# Patient Record
Sex: FEMALE | URBAN - METROPOLITAN AREA
[De-identification: names, ages, dates, MRNs, and addresses within clinical notes are randomized per-mention and may not be internally consistent; named-entity substitution may affect disease eponyms.]

---

## 2024-07-15 ENCOUNTER — NURSING HOME VISIT (OUTPATIENT)
Dept: POST ACUTE CARE | Facility: EXTERNAL LOCATION | Age: 89
End: 2024-07-15
Payer: MEDICARE

## 2024-07-15 DIAGNOSIS — K21.9 GASTROESOPHAGEAL REFLUX DISEASE, UNSPECIFIED WHETHER ESOPHAGITIS PRESENT: ICD-10-CM

## 2024-07-15 DIAGNOSIS — I10 HYPERTENSION, UNSPECIFIED TYPE: ICD-10-CM

## 2024-07-15 DIAGNOSIS — E13.9 OTHER SPECIFIED DIABETES MELLITUS WITHOUT COMPLICATION, WITH LONG-TERM CURRENT USE OF INSULIN (MULTI): ICD-10-CM

## 2024-07-15 DIAGNOSIS — I48.91 ATRIAL FIBRILLATION, UNSPECIFIED TYPE (MULTI): ICD-10-CM

## 2024-07-15 DIAGNOSIS — I21.4 NSTEMI (NON-ST ELEVATED MYOCARDIAL INFARCTION) (MULTI): Primary | ICD-10-CM

## 2024-07-15 DIAGNOSIS — Z79.4 OTHER SPECIFIED DIABETES MELLITUS WITHOUT COMPLICATION, WITH LONG-TERM CURRENT USE OF INSULIN (MULTI): ICD-10-CM

## 2024-07-15 PROBLEM — R53.1 WEAKNESS: Status: ACTIVE | Noted: 2024-07-15

## 2024-07-15 PROCEDURE — 99305 1ST NF CARE MODERATE MDM 35: CPT | Performed by: INTERNAL MEDICINE

## 2024-07-15 NOTE — LETTER
Patient: Camila Omer  : 1933    Encounter Date: 07/15/2024    HISTORY & PHYSICAL    Subjective  Chief complaint: Camila Omer is a 89 y.o. female who is being seen and evaluated for multiple medical problems.  Patient admitted to SNF for therapy due to weakness after recent hospitalization.    HPI:  HPI  Patient was admitted to the hospital and treated for NSTEMI.  Patient on anticoagulant and f/u outpatient.  PT OT evaluated patient with recommendations for SNF placement for further medical management and therapy.  Patient was seen and examined at the bedside, appears to be in no acute distress.  Denies nausea vomiting fever chills.  Denies chest pain or shortness of breath.  Past Medical History:   Diagnosis Date   • Atrial fibrillation (Multi)    • Cardiomyopathy (Multi)    • Diabetes mellitus (Multi)    • Hypertension    • NSTEMI (non-ST elevated myocardial infarction) (Multi)    • Presence of coronary angioplasty implant and graft    • Spondylosis, cervical        No past surgical history on file.    Family History   Problem Relation Name Age of Onset   • No Known Problems Mother     • No Known Problems Father         Social History     Socioeconomic History   • Marital status:        Vital signs: 152/77, 97.3, 78 18, 96%    Objective  Physical Exam  Constitutional:       General: She is not in acute distress.  Eyes:      Extraocular Movements: Extraocular movements intact.   Cardiovascular:      Rate and Rhythm: Normal rate and regular rhythm.   Pulmonary:      Effort: Pulmonary effort is normal.      Breath sounds: Normal breath sounds.   Abdominal:      General: Bowel sounds are normal.      Palpations: Abdomen is soft.   Musculoskeletal:      Cervical back: Neck supple.      Right lower leg: No edema.      Left lower leg: No edema.   Neurological:      Mental Status: She is alert.   Psychiatric:         Mood and Affect: Mood normal.         Behavior: Behavior is cooperative.          Assessment/Plan  Problem List Items Addressed This Visit       Atrial fibrillation (Multi)     Eliquis  Bleeding precautions  Metoprolol         NSTEMI (non-ST elevated myocardial infarction) (Multi) - Primary     Clopidogrel  Bleeding precautions  F/u outpatient cardiology         Hypertension     Monitor blood pressure  Metoprolol         Diabetes mellitus (Multi)     Monitor fasting blood sugar  Januvia  Insulin         GERD (gastroesophageal reflux disease)     PPI  Monitor GI symptoms          Hospital records reviewed  Medications, treatments, and labs reviewed  Continue medications and treatments as listed in EMR  Discussed with nursing and therapy      Scribe Attestation  I, Amos De León   attest that this documentation has been prepared under the direction and in the presence of Taryn Wen MD    Provider Attestation - Scribe documentation  All medical record entries made by the Scribe were at my direction and personally dictated by me. I have reviewed the chart and agree that the record accurately reflects my personal performance of the history, physical exam, discussion and plan.   Taryn Wen MD      Electronically Signed By: Taryn Wen MD   7/15/24 11:45 AM

## 2024-07-15 NOTE — ASSESSMENT & PLAN NOTE
Clopidogrel  Bleeding precautions  F/u outpatient cardiology   What Type Of Note Output Would You Prefer (Optional)?: Standard Output Is This A New Presentation, Or A Follow-Up?: Rash

## 2024-07-15 NOTE — PROGRESS NOTES
HISTORY & PHYSICAL    Subjective   Chief complaint: Camila Omer is a 89 y.o. female who is being seen and evaluated for multiple medical problems.  Patient admitted to SNF for therapy due to weakness after recent hospitalization.    HPI:  HPI  Patient was admitted to the hospital and treated for NSTEMI.  Patient on anticoagulant and f/u outpatient.  PT OT evaluated patient with recommendations for SNF placement for further medical management and therapy.  Patient was seen and examined at the bedside, appears to be in no acute distress.  Denies nausea vomiting fever chills.  Denies chest pain or shortness of breath.  Past Medical History:   Diagnosis Date    Atrial fibrillation (Multi)     Cardiomyopathy (Multi)     Diabetes mellitus (Multi)     Hypertension     NSTEMI (non-ST elevated myocardial infarction) (Multi)     Presence of coronary angioplasty implant and graft     Spondylosis, cervical        No past surgical history on file.    Family History   Problem Relation Name Age of Onset    No Known Problems Mother      No Known Problems Father         Social History     Socioeconomic History    Marital status:        Vital signs: 152/77, 97.3, 78 18, 96%    Objective   Physical Exam  Constitutional:       General: She is not in acute distress.  Eyes:      Extraocular Movements: Extraocular movements intact.   Cardiovascular:      Rate and Rhythm: Normal rate and regular rhythm.   Pulmonary:      Effort: Pulmonary effort is normal.      Breath sounds: Normal breath sounds.   Abdominal:      General: Bowel sounds are normal.      Palpations: Abdomen is soft.   Musculoskeletal:      Cervical back: Neck supple.      Right lower leg: No edema.      Left lower leg: No edema.   Neurological:      Mental Status: She is alert.   Psychiatric:         Mood and Affect: Mood normal.         Behavior: Behavior is cooperative.         Assessment/Plan   Problem List Items Addressed This Visit       Atrial fibrillation  (Multi)     Eliquis  Bleeding precautions  Metoprolol         NSTEMI (non-ST elevated myocardial infarction) (Multi) - Primary     Clopidogrel  Bleeding precautions  F/u outpatient cardiology         Hypertension     Monitor blood pressure  Metoprolol         Diabetes mellitus (Multi)     Monitor fasting blood sugar  Januvia  Insulin         GERD (gastroesophageal reflux disease)     PPI  Monitor GI symptoms          Hospital records reviewed  Medications, treatments, and labs reviewed  Continue medications and treatments as listed in EMR  Discussed with nursing and therapy      Scribe Attestation  IJessica Scribe   attest that this documentation has been prepared under the direction and in the presence of Taryn Wen MD    Provider Attestation - Scribe documentation  All medical record entries made by the Scribe were at my direction and personally dictated by me. I have reviewed the chart and agree that the record accurately reflects my personal performance of the history, physical exam, discussion and plan.   Taryn Wen MD

## 2024-07-16 ENCOUNTER — NURSING HOME VISIT (OUTPATIENT)
Dept: POST ACUTE CARE | Facility: EXTERNAL LOCATION | Age: 89
End: 2024-07-16
Payer: MEDICARE

## 2024-07-16 DIAGNOSIS — I48.91 ATRIAL FIBRILLATION, UNSPECIFIED TYPE (MULTI): ICD-10-CM

## 2024-07-16 DIAGNOSIS — R53.1 WEAKNESS: ICD-10-CM

## 2024-07-16 DIAGNOSIS — K21.9 GASTROESOPHAGEAL REFLUX DISEASE, UNSPECIFIED WHETHER ESOPHAGITIS PRESENT: ICD-10-CM

## 2024-07-16 DIAGNOSIS — E13.9 OTHER SPECIFIED DIABETES MELLITUS WITHOUT COMPLICATION, WITH LONG-TERM CURRENT USE OF INSULIN (MULTI): ICD-10-CM

## 2024-07-16 DIAGNOSIS — Z79.4 OTHER SPECIFIED DIABETES MELLITUS WITHOUT COMPLICATION, WITH LONG-TERM CURRENT USE OF INSULIN (MULTI): ICD-10-CM

## 2024-07-16 DIAGNOSIS — I10 HYPERTENSION, UNSPECIFIED TYPE: ICD-10-CM

## 2024-07-16 DIAGNOSIS — I21.4 NSTEMI (NON-ST ELEVATED MYOCARDIAL INFARCTION) (MULTI): Primary | ICD-10-CM

## 2024-07-16 PROCEDURE — 99309 SBSQ NF CARE MODERATE MDM 30: CPT | Performed by: INTERNAL MEDICINE

## 2024-07-16 NOTE — LETTER
Patient: Camila Omer  : 1933    Encounter Date: 2024    PROGRESS NOTE    Subjective  Chief complaint: Camila Omer is a 89 y.o. female who is an acute skilled patient being seen and evaluated for weakness & monthly general medical care and follow-up    HPI:  Patient presents for general medical care and f/u.  Patient seen and examined at bedside. Patient has diagnosis of GERD, denies heartburn, regurgitation, epigastric discomfort, sour taste, and cough.   HTN, denies fatigue, sob, and palpitations. T2DM, Patient denies vision changes, excessive thirst, sweating, urinary frequency. CVA, denies changes in weakness and speech. Afib, denies palpitation or chest pain. She is being evaluated by therapy today. No issues per nursing.  Patient has no acute complaints.      Objective  Vital signs: 134/50,73,95,    Physical Exam  Constitutional:       General: She is not in acute distress.  Eyes:      Extraocular Movements: Extraocular movements intact.   Cardiovascular:      Rate and Rhythm: Normal rate and regular rhythm.   Pulmonary:      Effort: Pulmonary effort is normal.      Breath sounds: Normal breath sounds.   Abdominal:      General: Bowel sounds are normal.      Palpations: Abdomen is soft.   Musculoskeletal:      Cervical back: Neck supple.      Right lower leg: No edema.      Left lower leg: No edema.   Neurological:      Mental Status: She is alert.   Psychiatric:         Mood and Affect: Mood normal.         Behavior: Behavior is cooperative.         Assessment/Plan  Problem List Items Addressed This Visit       Atrial fibrillation (Multi)     Eliquis  Bleeding precautions  Metoprolol         NSTEMI (non-ST elevated myocardial infarction) (Multi) - Primary     Clopidogrel  Bleeding precautions  F/u outpatient cardiology         Hypertension     Monitor blood pressure  Metoprolol         Diabetes mellitus (Multi)     Monitor fasting blood sugar  Januvia  Insulin         Weakness      Continue working with therapy          GERD (gastroesophageal reflux disease)     PPI  Monitor GI symptoms          Medications, treatments, and labs reviewed  Continue medications and treatments as listed in EMR    Scribe Attestation  IGiana Scribe   attest that this documentation has been prepared under the direction and in the presence of Taryn Wen MD.     Provider Attestation - Scribe documentation  All medical record entries made by the Scribe were at my direction and personally dictated by me. I have reviewed the chart and agree that the record accurately reflects my personal performance of the history, physical exam, discussion and plan.   Taryn Wen MD      Electronically Signed By: Taryn Wen MD   7/16/24  1:39 PM

## 2024-07-16 NOTE — PROGRESS NOTES
PROGRESS NOTE    Subjective   Chief complaint: Camila Omer is a 89 y.o. female who is an acute skilled patient being seen and evaluated for weakness & monthly general medical care and follow-up    HPI:  Patient presents for general medical care and f/u.  Patient seen and examined at bedside. Patient has diagnosis of GERD, denies heartburn, regurgitation, epigastric discomfort, sour taste, and cough.   HTN, denies fatigue, sob, and palpitations. T2DM, Patient denies vision changes, excessive thirst, sweating, urinary frequency. CVA, denies changes in weakness and speech. Afib, denies palpitation or chest pain. She is being evaluated by therapy today. No issues per nursing.  Patient has no acute complaints.      Objective   Vital signs: 134/50,73,95,    Physical Exam  Constitutional:       General: She is not in acute distress.  Eyes:      Extraocular Movements: Extraocular movements intact.   Cardiovascular:      Rate and Rhythm: Normal rate and regular rhythm.   Pulmonary:      Effort: Pulmonary effort is normal.      Breath sounds: Normal breath sounds.   Abdominal:      General: Bowel sounds are normal.      Palpations: Abdomen is soft.   Musculoskeletal:      Cervical back: Neck supple.      Right lower leg: No edema.      Left lower leg: No edema.   Neurological:      Mental Status: She is alert.   Psychiatric:         Mood and Affect: Mood normal.         Behavior: Behavior is cooperative.         Assessment/Plan   Problem List Items Addressed This Visit       Atrial fibrillation (Multi)     Eliquis  Bleeding precautions  Metoprolol         NSTEMI (non-ST elevated myocardial infarction) (Multi) - Primary     Clopidogrel  Bleeding precautions  F/u outpatient cardiology         Hypertension     Monitor blood pressure  Metoprolol         Diabetes mellitus (Multi)     Monitor fasting blood sugar  Januvia  Insulin         Weakness     Continue working with therapy          GERD (gastroesophageal reflux  disease)     PPI  Monitor GI symptoms          Medications, treatments, and labs reviewed  Continue medications and treatments as listed in EMR    Scribe Attestation  I, Amos Salgado   attest that this documentation has been prepared under the direction and in the presence of Taryn Wen MD.     Provider Attestation - Scribe documentation  All medical record entries made by the Scribe were at my direction and personally dictated by me. I have reviewed the chart and agree that the record accurately reflects my personal performance of the history, physical exam, discussion and plan.   Taryn Wen MD

## 2024-07-17 ENCOUNTER — NURSING HOME VISIT (OUTPATIENT)
Dept: POST ACUTE CARE | Facility: EXTERNAL LOCATION | Age: 89
End: 2024-07-17
Payer: MEDICARE

## 2024-07-17 DIAGNOSIS — I48.91 ATRIAL FIBRILLATION, UNSPECIFIED TYPE (MULTI): ICD-10-CM

## 2024-07-17 DIAGNOSIS — M79.671 RIGHT FOOT PAIN: Primary | ICD-10-CM

## 2024-07-17 DIAGNOSIS — I10 HYPERTENSION, UNSPECIFIED TYPE: ICD-10-CM

## 2024-07-17 PROCEDURE — 99308 SBSQ NF CARE LOW MDM 20: CPT | Performed by: REGISTERED NURSE

## 2024-07-17 NOTE — LETTER
Patient: Camila Omer  : 1933    Encounter Date: 2024    PROGRESS NOTE    Subjective  Chief complaint: Camila Omer is a 89 y.o. female who is an acute skilled patient being seen and evaluated for weakness    HPI:  24 Patient in therapy d/t generalized weakness.  Patient presents for f/u.  Continues to work toward goals in therapy.  No new complaints at this time.    Objective  Vital signs: 158/88, 97.1, 77, 18, 97%    Physical Exam  Constitutional:       General: She is not in acute distress.  Eyes:      Extraocular Movements: Extraocular movements intact.   Pulmonary:      Effort: Pulmonary effort is normal.   Musculoskeletal:      Cervical back: Neck supple.   Neurological:      Mental Status: She is alert.   Psychiatric:         Mood and Affect: Mood normal.         Behavior: Behavior is cooperative.         Assessment/Plan  Problem List Items Addressed This Visit       Atrial fibrillation (Multi)     Eliquis  Bleeding precautions  Metoprolol  Monitor HR            Hypertension     Monitor blood pressure  Metoprolol         Foot pain - Primary     Monitor   Check uric acid level           Medications, treatments, and labs reviewed  Continue medications and treatments as listed in [facility]    Scribe Attestation  ITessa Scribe   attest that this documentation has been prepared under the direction and in the presence of CHELSEA De La Cruz.    Provider Attestation - Scribe documentation  All medical record entries made by the Scribe were at my direction and personally dictated by me. I have reviewed the chart and agree that the record accurately reflects my personal performance of the history, physical exam, discussion and plan.    CHELSEA De La Cruz        Electronically Signed By: CHELSEA De La Cruz   24  3:43 PM

## 2024-07-18 ENCOUNTER — NURSING HOME VISIT (OUTPATIENT)
Dept: POST ACUTE CARE | Facility: EXTERNAL LOCATION | Age: 89
End: 2024-07-18
Payer: MEDICARE

## 2024-07-18 DIAGNOSIS — E13.9 OTHER SPECIFIED DIABETES MELLITUS WITHOUT COMPLICATION, WITH LONG-TERM CURRENT USE OF INSULIN (MULTI): ICD-10-CM

## 2024-07-18 DIAGNOSIS — R35.0 URINARY FREQUENCY: ICD-10-CM

## 2024-07-18 DIAGNOSIS — Z79.4 OTHER SPECIFIED DIABETES MELLITUS WITHOUT COMPLICATION, WITH LONG-TERM CURRENT USE OF INSULIN (MULTI): ICD-10-CM

## 2024-07-18 DIAGNOSIS — I10 HYPERTENSION, UNSPECIFIED TYPE: ICD-10-CM

## 2024-07-18 DIAGNOSIS — I21.4 NSTEMI (NON-ST ELEVATED MYOCARDIAL INFARCTION) (MULTI): Primary | ICD-10-CM

## 2024-07-18 DIAGNOSIS — I48.91 ATRIAL FIBRILLATION, UNSPECIFIED TYPE (MULTI): ICD-10-CM

## 2024-07-18 DIAGNOSIS — R53.1 WEAKNESS: ICD-10-CM

## 2024-07-18 PROBLEM — M54.9 BACK PAIN: Status: ACTIVE | Noted: 2024-07-18

## 2024-07-18 PROCEDURE — 99309 SBSQ NF CARE MODERATE MDM 30: CPT | Performed by: INTERNAL MEDICINE

## 2024-07-18 NOTE — PROGRESS NOTES
PROGRESS NOTE    Subjective   Chief complaint: Camila DOE Omer is a 89 y.o. female who is an acute skilled patient being seen and evaluated for weakness and monthly general medical care and follow-up    HPI:  HPI  Patient presents for general medical care and f/u.  Patient seen and examined at bedside.  No issues per nursing.  Patient is working towards goals in therapy. Patient had c/o tight/sore back. Patient did have CPR performed in hospital. Patient to continue Tylenol as needed. Patient also complaining of urinary frequency and UA C and S ordered.  HTN Denies chest pain and headache.  GERD controlled.  Denies heartburn, regurgitation, epigastric discomfort, sour taste, and cough.  DM, denies polydipsia polyuria polyphagia.  AFIB stable, denies palpitations and chest pain.    Objective   Vital signs: 132/68, 88, 12, 98.3, 98%    Physical Exam  Constitutional:       General: She is not in acute distress.  Eyes:      Extraocular Movements: Extraocular movements intact.   Cardiovascular:      Rate and Rhythm: Normal rate and regular rhythm.   Pulmonary:      Effort: Pulmonary effort is normal.      Breath sounds: Normal breath sounds.   Abdominal:      General: Bowel sounds are normal.      Palpations: Abdomen is soft.   Musculoskeletal:      Cervical back: Neck supple.      Right lower leg: No edema.      Left lower leg: No edema.   Neurological:      Mental Status: She is alert.   Psychiatric:         Mood and Affect: Mood normal.         Behavior: Behavior is cooperative.         Assessment/Plan   Problem List Items Addressed This Visit       Atrial fibrillation (Multi)     Eliquis  Bleeding precautions  Metoprolol         NSTEMI (non-ST elevated myocardial infarction) (Multi) - Primary     Clopidogrel  Bleeding precautions  F/u outpatient cardiology         Hypertension     Monitor blood pressure  Metoprolol         Diabetes mellitus (Multi)     Monitor fasting blood sugar  Januvia  Insulin         Weakness      Continue therapy          Medications, treatments, and labs reviewed  Continue medications and treatments as listed in EMR      Scribe Attestation  I, Amos De León   attest that this documentation has been prepared under the direction and in the presence of Taryn Wen MD    Provider Attestation - Scribe documentation  All medical record entries made by the Scribe were at my direction and personally dictated by me. I have reviewed the chart and agree that the record accurately reflects my personal performance of the history, physical exam, discussion and plan.   Taryn Wen MD

## 2024-07-18 NOTE — LETTER
Patient: Camila Omer  : 1933    Encounter Date: 2024    PROGRESS NOTE    Subjective  Chief complaint: Camila Omer is a 89 y.o. female who is an acute skilled patient being seen and evaluated for weakness and monthly general medical care and follow-up    HPI:  HPI  Patient presents for general medical care and f/u.  Patient seen and examined at bedside.  No issues per nursing.  Patient is working towards goals in therapy. Patient had c/o tight/sore back. Patient did have CPR performed in hospital. Patient to continue Tylenol as needed. Patient also complaining of urinary frequency and UA C and S ordered.  HTN Denies chest pain and headache.  GERD controlled.  Denies heartburn, regurgitation, epigastric discomfort, sour taste, and cough.  DM, denies polydipsia polyuria polyphagia.  AFIB stable, denies palpitations and chest pain.    Objective  Vital signs: 132/68, 88, 12, 98.3, 98%    Physical Exam  Constitutional:       General: She is not in acute distress.  Eyes:      Extraocular Movements: Extraocular movements intact.   Cardiovascular:      Rate and Rhythm: Normal rate and regular rhythm.   Pulmonary:      Effort: Pulmonary effort is normal.      Breath sounds: Normal breath sounds.   Abdominal:      General: Bowel sounds are normal.      Palpations: Abdomen is soft.   Musculoskeletal:      Cervical back: Neck supple.      Right lower leg: No edema.      Left lower leg: No edema.   Neurological:      Mental Status: She is alert.   Psychiatric:         Mood and Affect: Mood normal.         Behavior: Behavior is cooperative.         Assessment/Plan  Problem List Items Addressed This Visit       Atrial fibrillation (Multi)     Eliquis  Bleeding precautions  Metoprolol         NSTEMI (non-ST elevated myocardial infarction) (Multi) - Primary     Clopidogrel  Bleeding precautions  F/u outpatient cardiology         Hypertension     Monitor blood pressure  Metoprolol         Diabetes mellitus  (Multi)     Monitor fasting blood sugar  Januvia  Insulin         Weakness     Continue therapy          Medications, treatments, and labs reviewed  Continue medications and treatments as listed in EMR      Scribe Attestation  I, Amos De León   attest that this documentation has been prepared under the direction and in the presence of Taryn Wen MD    Provider Attestation - Scribe documentation  All medical record entries made by the Scribe were at my direction and personally dictated by me. I have reviewed the chart and agree that the record accurately reflects my personal performance of the history, physical exam, discussion and plan.   Taryn Wen MD        Electronically Signed By: Taryn Wen MD   7/18/24  2:50 PM

## 2024-07-22 ENCOUNTER — NURSING HOME VISIT (OUTPATIENT)
Dept: POST ACUTE CARE | Facility: EXTERNAL LOCATION | Age: 89
End: 2024-07-22
Payer: MEDICARE

## 2024-07-22 DIAGNOSIS — M79.671 RIGHT FOOT PAIN: ICD-10-CM

## 2024-07-22 DIAGNOSIS — I48.91 ATRIAL FIBRILLATION, UNSPECIFIED TYPE (MULTI): ICD-10-CM

## 2024-07-22 DIAGNOSIS — E13.9 OTHER SPECIFIED DIABETES MELLITUS WITHOUT COMPLICATION, WITH LONG-TERM CURRENT USE OF INSULIN (MULTI): ICD-10-CM

## 2024-07-22 DIAGNOSIS — R53.1 WEAKNESS: ICD-10-CM

## 2024-07-22 DIAGNOSIS — Z79.4 OTHER SPECIFIED DIABETES MELLITUS WITHOUT COMPLICATION, WITH LONG-TERM CURRENT USE OF INSULIN (MULTI): ICD-10-CM

## 2024-07-22 DIAGNOSIS — K21.9 GASTROESOPHAGEAL REFLUX DISEASE, UNSPECIFIED WHETHER ESOPHAGITIS PRESENT: ICD-10-CM

## 2024-07-22 DIAGNOSIS — I10 HYPERTENSION, UNSPECIFIED TYPE: ICD-10-CM

## 2024-07-22 PROBLEM — M79.673 FOOT PAIN: Status: ACTIVE | Noted: 2024-07-22

## 2024-07-22 PROCEDURE — 99308 SBSQ NF CARE LOW MDM 20: CPT | Performed by: INTERNAL MEDICINE

## 2024-07-22 NOTE — LETTER
Patient: Camila Omer  : 1933    Encounter Date: 2024    PROGRESS NOTE    Subjective  Chief complaint: Camila Omer is a 89 y.o. female who is an acute skilled patient being seen and evaluated for weakness    HPI:  Patient presents for f/u therapy and general medical care.  Patient seen and examined at beside.  Therapy has been working with the patient to improve strength, endurance, ADLs, and transfers d/t generalized weakness. She has right foot and back pain. PRN tylenol helps with back pain.  Patient has no acute concerns today.      Objective  Vital signs: 155/89,72,100%,     Physical Exam  Constitutional:       General: She is not in acute distress.  Eyes:      Extraocular Movements: Extraocular movements intact.   Cardiovascular:      Rate and Rhythm: Normal rate and regular rhythm.   Pulmonary:      Effort: Pulmonary effort is normal.      Breath sounds: Normal breath sounds.   Abdominal:      General: Bowel sounds are normal.      Palpations: Abdomen is soft.   Musculoskeletal:      Cervical back: Neck supple.      Right lower leg: No edema.      Left lower leg: No edema.   Neurological:      Mental Status: She is alert.   Psychiatric:         Mood and Affect: Mood normal.         Behavior: Behavior is cooperative.         Assessment/Plan  Problem List Items Addressed This Visit       Atrial fibrillation (Multi)     Eliquis  Bleeding precautions  Metoprolol         Hypertension     Monitor blood pressure  Metoprolol         Diabetes mellitus (Multi)     Monitor fasting blood sugar  Januvia  Insulin         Weakness     Continue therapy         GERD (gastroesophageal reflux disease)     PPI  Monitor GI symptoms         Foot pain     Voltaren gel TID to right foot  Monitor           Medications, treatments, and labs reviewed  Continue medications and treatments as listed in EMR    Scribe Attestation  I, Amos Salgado   attest that this documentation has been prepared under  the direction and in the presence of Taryn Wen MD.     Provider Attestation - Scribe documentation  All medical record entries made by the Scribe were at my direction and personally dictated by me. I have reviewed the chart and agree that the record accurately reflects my personal performance of the history, physical exam, discussion and plan.   Taryn Wen MD      Electronically Signed By: Taryn Wen MD   7/22/24  2:11 PM

## 2024-07-22 NOTE — PROGRESS NOTES
PROGRESS NOTE    Subjective   Chief complaint: Camila Omer is a 89 y.o. female who is an acute skilled patient being seen and evaluated for weakness    HPI:  Patient presents for f/u therapy and general medical care.  Patient seen and examined at beside.  Therapy has been working with the patient to improve strength, endurance, ADLs, and transfers d/t generalized weakness. She has right foot and back pain. PRN tylenol helps with back pain.  Patient has no acute concerns today.      Objective   Vital signs: 155/89,72,100%,     Physical Exam  Constitutional:       General: She is not in acute distress.  Eyes:      Extraocular Movements: Extraocular movements intact.   Cardiovascular:      Rate and Rhythm: Normal rate and regular rhythm.   Pulmonary:      Effort: Pulmonary effort is normal.      Breath sounds: Normal breath sounds.   Abdominal:      General: Bowel sounds are normal.      Palpations: Abdomen is soft.   Musculoskeletal:      Cervical back: Neck supple.      Right lower leg: No edema.      Left lower leg: No edema.   Neurological:      Mental Status: She is alert.   Psychiatric:         Mood and Affect: Mood normal.         Behavior: Behavior is cooperative.         Assessment/Plan   Problem List Items Addressed This Visit       Atrial fibrillation (Multi)     Eliquis  Bleeding precautions  Metoprolol         Hypertension     Monitor blood pressure  Metoprolol         Diabetes mellitus (Multi)     Monitor fasting blood sugar  Januvia  Insulin         Weakness     Continue therapy         GERD (gastroesophageal reflux disease)     PPI  Monitor GI symptoms         Foot pain     Voltaren gel TID to right foot  Monitor           Medications, treatments, and labs reviewed  Continue medications and treatments as listed in EMR    Scribe Attestation  Giana VAUGHN, Amos   attest that this documentation has been prepared under the direction and in the presence of Taryn Wen MD.     Provider  Attestation - Scribe documentation  All medical record entries made by the Scribe were at my direction and personally dictated by me. I have reviewed the chart and agree that the record accurately reflects my personal performance of the history, physical exam, discussion and plan.   Taryn Wen MD

## 2024-07-22 NOTE — ASSESSMENT & PLAN NOTE
Monitor fasting blood sugar  Januvia  Insulin   [Cough] : cough [Diabetes] : diabetes [Fever] : no fever [Fatigue] : no fatigue [Chills] : no chills [Hemoptysis] : no hemoptysis [Sputum] : no sputum [Dyspnea] : no dyspnea [Chest Discomfort] : no chest discomfort [GERD] : no gerd [Myalgias] : no myalgias [Rash] : no rash [Anemia] : no anemia [Headache] : no headache [Thyroid Problem] : no thyroid problem

## 2024-07-23 ENCOUNTER — NURSING HOME VISIT (OUTPATIENT)
Dept: POST ACUTE CARE | Facility: EXTERNAL LOCATION | Age: 89
End: 2024-07-23
Payer: MEDICARE

## 2024-07-23 DIAGNOSIS — I48.91 ATRIAL FIBRILLATION, UNSPECIFIED TYPE (MULTI): ICD-10-CM

## 2024-07-23 DIAGNOSIS — I21.4 NSTEMI (NON-ST ELEVATED MYOCARDIAL INFARCTION) (MULTI): Primary | ICD-10-CM

## 2024-07-23 DIAGNOSIS — R53.1 WEAKNESS: ICD-10-CM

## 2024-07-23 DIAGNOSIS — Z79.4 OTHER SPECIFIED DIABETES MELLITUS WITHOUT COMPLICATION, WITH LONG-TERM CURRENT USE OF INSULIN (MULTI): ICD-10-CM

## 2024-07-23 DIAGNOSIS — K21.9 GASTROESOPHAGEAL REFLUX DISEASE, UNSPECIFIED WHETHER ESOPHAGITIS PRESENT: ICD-10-CM

## 2024-07-23 DIAGNOSIS — I10 HYPERTENSION, UNSPECIFIED TYPE: ICD-10-CM

## 2024-07-23 DIAGNOSIS — E13.9 OTHER SPECIFIED DIABETES MELLITUS WITHOUT COMPLICATION, WITH LONG-TERM CURRENT USE OF INSULIN (MULTI): ICD-10-CM

## 2024-07-23 PROCEDURE — 99308 SBSQ NF CARE LOW MDM 20: CPT | Performed by: INTERNAL MEDICINE

## 2024-07-23 NOTE — PROGRESS NOTES
PROGRESS NOTE    Subjective   Chief complaint: Camila DOE Omer is a 89 y.o. female who is an acute skilled patient being seen and evaluated for weakness    HPI:  Patient presents for f/u therapy and general medical care.  Patient seen and examined at beside.  Therapy has been working with the patient to improve strength, endurance, ADLs, and transfers d/t generalized weakness. ST addressing cognition and language\communication skills.  Right foot pain improving with voltaren. Patient has no acute concerns today.      Objective   Vital signs: 162/78,71,98%,     Physical Exam  Constitutional:       General: She is not in acute distress.  Eyes:      Extraocular Movements: Extraocular movements intact.   Cardiovascular:      Rate and Rhythm: Normal rate and regular rhythm.   Pulmonary:      Effort: Pulmonary effort is normal.      Breath sounds: Normal breath sounds.   Abdominal:      General: Bowel sounds are normal.      Palpations: Abdomen is soft.   Musculoskeletal:      Cervical back: Neck supple.      Right lower leg: No edema.      Left lower leg: No edema.   Neurological:      Mental Status: She is alert.   Psychiatric:         Mood and Affect: Mood normal.         Behavior: Behavior is cooperative.         Assessment/Plan   Problem List Items Addressed This Visit       Atrial fibrillation (Multi)     Eliquis  Bleeding precautions  Metoprolol  Monitor HR         NSTEMI (non-ST elevated myocardial infarction) (Multi) - Primary     Clopidogrel  Bleeding precautions  F/u outpatient cardiology         Hypertension     Monitor blood pressure  Metoprolol         Diabetes mellitus (Multi)     Monitor fasting blood sugar  Januvia  Insulin         Weakness     Continue therapy         GERD (gastroesophageal reflux disease)     PPI  Monitor GI symptoms          Medications, treatments, and labs reviewed  Continue medications and treatments as listed in EMR    Amos AttestGiana Carmona Scribe attest  that this documentation has been prepared under the direction and in the presence of Taryn Wen MD.     Provider Attestation - Scribe documentation  All medical record entries made by the Scribe were at my direction and personally dictated by me. I have reviewed the chart and agree that the record accurately reflects my personal performance of the history, physical exam, discussion and plan.   Taryn Wen MD

## 2024-07-23 NOTE — LETTER
Patient: Camila Omer  : 1933    Encounter Date: 2024    PROGRESS NOTE    Subjective  Chief complaint: Camila Omer is a 89 y.o. female who is an acute skilled patient being seen and evaluated for weakness    HPI:  Patient presents for f/u therapy and general medical care.  Patient seen and examined at beside.  Therapy has been working with the patient to improve strength, endurance, ADLs, and transfers d/t generalized weakness. ST addressing cognition and language\communication skills.  Right foot pain improving with voltaren. Patient has no acute concerns today.      Objective  Vital signs: 162/78,71,98%,     Physical Exam  Constitutional:       General: She is not in acute distress.  Eyes:      Extraocular Movements: Extraocular movements intact.   Cardiovascular:      Rate and Rhythm: Normal rate and regular rhythm.   Pulmonary:      Effort: Pulmonary effort is normal.      Breath sounds: Normal breath sounds.   Abdominal:      General: Bowel sounds are normal.      Palpations: Abdomen is soft.   Musculoskeletal:      Cervical back: Neck supple.      Right lower leg: No edema.      Left lower leg: No edema.   Neurological:      Mental Status: She is alert.   Psychiatric:         Mood and Affect: Mood normal.         Behavior: Behavior is cooperative.         Assessment/Plan  Problem List Items Addressed This Visit       Atrial fibrillation (Multi)     Eliquis  Bleeding precautions  Metoprolol  Monitor HR         NSTEMI (non-ST elevated myocardial infarction) (Multi) - Primary     Clopidogrel  Bleeding precautions  F/u outpatient cardiology         Hypertension     Monitor blood pressure  Metoprolol         Diabetes mellitus (Multi)     Monitor fasting blood sugar  Januvia  Insulin         Weakness     Continue therapy         GERD (gastroesophageal reflux disease)     PPI  Monitor GI symptoms          Medications, treatments, and labs reviewed  Continue medications and treatments as  listed in EMR    Scribe Attestation  I, Amos Salgado   attest that this documentation has been prepared under the direction and in the presence of Taryn Wen MD.     Provider Attestation - Scribe documentation  All medical record entries made by the Scribe were at my direction and personally dictated by me. I have reviewed the chart and agree that the record accurately reflects my personal performance of the history, physical exam, discussion and plan.   Taryn Wen MD      Electronically Signed By: Taryn Wen MD   7/23/24  1:39 PM

## 2024-07-24 ENCOUNTER — NURSING HOME VISIT (OUTPATIENT)
Dept: POST ACUTE CARE | Facility: EXTERNAL LOCATION | Age: 89
End: 2024-07-24

## 2024-07-24 ENCOUNTER — NURSING HOME VISIT (OUTPATIENT)
Dept: POST ACUTE CARE | Facility: EXTERNAL LOCATION | Age: 89
End: 2024-07-24
Payer: MEDICARE

## 2024-07-24 DIAGNOSIS — I21.4 NSTEMI (NON-ST ELEVATED MYOCARDIAL INFARCTION) (MULTI): ICD-10-CM

## 2024-07-24 DIAGNOSIS — I10 HYPERTENSION, UNSPECIFIED TYPE: ICD-10-CM

## 2024-07-24 DIAGNOSIS — Z79.4 OTHER SPECIFIED DIABETES MELLITUS WITHOUT COMPLICATION, WITH LONG-TERM CURRENT USE OF INSULIN (MULTI): ICD-10-CM

## 2024-07-24 DIAGNOSIS — E13.9 OTHER SPECIFIED DIABETES MELLITUS WITHOUT COMPLICATION, WITH LONG-TERM CURRENT USE OF INSULIN (MULTI): ICD-10-CM

## 2024-07-24 DIAGNOSIS — R53.1 WEAKNESS: ICD-10-CM

## 2024-07-24 DIAGNOSIS — R53.1 WEAKNESS: Primary | ICD-10-CM

## 2024-07-24 DIAGNOSIS — I48.91 ATRIAL FIBRILLATION, UNSPECIFIED TYPE (MULTI): ICD-10-CM

## 2024-07-24 PROCEDURE — 99309 SBSQ NF CARE MODERATE MDM 30: CPT | Performed by: REGISTERED NURSE

## 2024-07-24 PROCEDURE — 99308 SBSQ NF CARE LOW MDM 20: CPT | Performed by: INTERNAL MEDICINE

## 2024-07-24 NOTE — LETTER
Patient: Camila Omer  : 1933    Encounter Date: 2024    PROGRESS NOTE    Subjective  Chief complaint: Camila Omer is a 89 y.o. female who is an acute skilled patient being seen and evaluated for weakness    HPI:  24 Patient has no new complaints or concerns today.  Continues working towards goals in therapy.  Denies constitutional symptoms.    Objective  Vital signs: 144/67, 97.7, 62, 17, 97%    Physical Exam  Constitutional:       General: She is not in acute distress.  Eyes:      Extraocular Movements: Extraocular movements intact.   Pulmonary:      Effort: Pulmonary effort is normal.   Musculoskeletal:      Cervical back: Neck supple.   Neurological:      Mental Status: She is alert.   Psychiatric:         Mood and Affect: Mood normal.         Behavior: Behavior is cooperative.         Assessment/Plan  Problem List Items Addressed This Visit       Atrial fibrillation (Multi)     Eliquis  Bleeding precautions  Metoprolol  Monitor HR            NSTEMI (non-ST elevated myocardial infarction) (Multi)     Clopidogrel  Bleeding precautions  F/u outpatient cardiology            Hypertension     Monitor blood pressure  Metoprolol         Diabetes mellitus (Multi)     Monitor fasting blood sugar  Januvia  Insulin         Weakness - Primary     Pt/ot           Medications, treatments, and labs reviewed  Continue medications and treatments as listed in [facility]    Scribe Attestation  I, Amos Hooker   attest that this documentation has been prepared under the direction and in the presence of CHELSEA De La Cruz.    Provider Attestation - Scribe documentation  All medical record entries made by the Scribe were at my direction and personally dictated by me. I have reviewed the chart and agree that the record accurately reflects my personal performance of the history, physical exam, discussion and plan.    CHELSEA De La Cruz        Electronically Signed By: Nicholas  JAVIER Bartlett, STACEY-TIEN   7/31/24  7:01 PM

## 2024-07-24 NOTE — LETTER
Patient: Camila Omer  : 1933    Encounter Date: 2024    PROGRESS NOTE    Subjective  Chief complaint: Camila Omer is a 89 y.o. female who is an acute skilled patient being seen and evaluated for weakness    HPI:  HPI  Patient has been working in therapy to improve strength, endurance, and ADLs.  Patient continues to work toward goals.  No new concerns today.  Denies n/v/f/c pain.  Patient requires assistance for transfers, ADLs and mobility.  No new issues or concerns.  No acute distress.  Objective  Vital signs:   144/67, 97%, 17, 62, 97.7, blood sugar 124    Physical Exam  Constitutional:       General: She is not in acute distress.  Eyes:      Extraocular Movements: Extraocular movements intact.   Cardiovascular:      Rate and Rhythm: Normal rate and regular rhythm.   Pulmonary:      Effort: Pulmonary effort is normal.      Breath sounds: Normal breath sounds.   Abdominal:      General: Bowel sounds are normal.      Palpations: Abdomen is soft.   Musculoskeletal:      Cervical back: Neck supple.      Right lower leg: No edema.      Left lower leg: No edema.   Neurological:      Mental Status: She is alert.   Psychiatric:         Mood and Affect: Mood normal.         Behavior: Behavior is cooperative.         Assessment/Plan  Problem List Items Addressed This Visit       NSTEMI (non-ST elevated myocardial infarction) (Multi)     Clopidogrel  Bleeding precautions  F/u outpatient cardiology         Hypertension     Monitor blood pressure  Metoprolol         Diabetes mellitus (Multi)     Monitor fasting blood sugar  Januvia  Insulin         Weakness     Continue therapy          Medications, treatments, and labs reviewed  Continue medications and treatments as listed in [facility]    Taryn Wen MD    1. Weakness        2. NSTEMI (non-ST elevated myocardial infarction) (Multi)        3. Other specified diabetes mellitus without complication, with long-term current use of insulin (Multi)        4.  Hypertension, unspecified type             Scribe Attestation  By signing my name below, I, Amos Luciano   attest that this documentation has been prepared under the direction and in the presence of Taryn Wen MD.    Provider Attestation - Scribe documentation  All medical record entries made by the Scribe were at my direction and personally dictated by me. I have reviewed the chart and agree that the record accurately reflects my personal performance of the history, physical exam, discussion and plan.      Electronically Signed By: Taryn Wen MD   7/25/24  6:31 PM

## 2024-07-25 NOTE — PROGRESS NOTES
PROGRESS NOTE    Subjective   Chief complaint: Camila DOE Omer is a 89 y.o. female who is an acute skilled patient being seen and evaluated for weakness    HPI:  7/24/24 Patient has no new complaints or concerns today.  Continues working towards goals in therapy.  Denies constitutional symptoms.    Objective   Vital signs: 144/67, 97.7, 62, 17, 97%    Physical Exam  Constitutional:       General: She is not in acute distress.  Eyes:      Extraocular Movements: Extraocular movements intact.   Pulmonary:      Effort: Pulmonary effort is normal.   Musculoskeletal:      Cervical back: Neck supple.   Neurological:      Mental Status: She is alert.   Psychiatric:         Mood and Affect: Mood normal.         Behavior: Behavior is cooperative.         Assessment/Plan   Problem List Items Addressed This Visit       Atrial fibrillation (Multi)     Eliquis  Bleeding precautions  Metoprolol  Monitor HR            NSTEMI (non-ST elevated myocardial infarction) (Multi)     Clopidogrel  Bleeding precautions  F/u outpatient cardiology            Hypertension     Monitor blood pressure  Metoprolol         Diabetes mellitus (Multi)     Monitor fasting blood sugar  Januvia  Insulin         Weakness - Primary     Pt/ot           Medications, treatments, and labs reviewed  Continue medications and treatments as listed in [facility]    Scribe Attestation  I, Amos Hooker   attest that this documentation has been prepared under the direction and in the presence of CHELSEA De La Cruz.    Provider Attestation - Scribe documentation  All medical record entries made by the Scribe were at my direction and personally dictated by me. I have reviewed the chart and agree that the record accurately reflects my personal performance of the history, physical exam, discussion and plan.    CHELSEA De La Cruz

## 2024-07-25 NOTE — PROGRESS NOTES
PROGRESS NOTE    Subjective   Chief complaint: Camila Omer is a 89 y.o. female who is an acute skilled patient being seen and evaluated for weakness    HPI:  HPI  Patient has been working in therapy to improve strength, endurance, and ADLs.  Patient continues to work toward goals.  No new concerns today.  Denies n/v/f/c pain.  Patient requires assistance for transfers, ADLs and mobility.  No new issues or concerns.  No acute distress.  Objective   Vital signs:   144/67, 97%, 17, 62, 97.7, blood sugar 124    Physical Exam  Constitutional:       General: She is not in acute distress.  Eyes:      Extraocular Movements: Extraocular movements intact.   Cardiovascular:      Rate and Rhythm: Normal rate and regular rhythm.   Pulmonary:      Effort: Pulmonary effort is normal.      Breath sounds: Normal breath sounds.   Abdominal:      General: Bowel sounds are normal.      Palpations: Abdomen is soft.   Musculoskeletal:      Cervical back: Neck supple.      Right lower leg: No edema.      Left lower leg: No edema.   Neurological:      Mental Status: She is alert.   Psychiatric:         Mood and Affect: Mood normal.         Behavior: Behavior is cooperative.         Assessment/Plan   Problem List Items Addressed This Visit       NSTEMI (non-ST elevated myocardial infarction) (Multi)     Clopidogrel  Bleeding precautions  F/u outpatient cardiology         Hypertension     Monitor blood pressure  Metoprolol         Diabetes mellitus (Multi)     Monitor fasting blood sugar  Januvia  Insulin         Weakness     Continue therapy          Medications, treatments, and labs reviewed  Continue medications and treatments as listed in [facility]    Taryn Wen MD    1. Weakness        2. NSTEMI (non-ST elevated myocardial infarction) (Multi)        3. Other specified diabetes mellitus without complication, with long-term current use of insulin (Multi)        4. Hypertension, unspecified type             Scribe Attestation  By  signing my name below, I, Amos Luciano   attest that this documentation has been prepared under the direction and in the presence of Taryn Wen MD.    Provider Attestation - Scribe documentation  All medical record entries made by the Scribe were at my direction and personally dictated by me. I have reviewed the chart and agree that the record accurately reflects my personal performance of the history, physical exam, discussion and plan.

## 2024-07-26 ENCOUNTER — NURSING HOME VISIT (OUTPATIENT)
Dept: POST ACUTE CARE | Facility: EXTERNAL LOCATION | Age: 89
End: 2024-07-26
Payer: MEDICARE

## 2024-07-26 DIAGNOSIS — Z79.4 OTHER SPECIFIED DIABETES MELLITUS WITHOUT COMPLICATION, WITH LONG-TERM CURRENT USE OF INSULIN (MULTI): ICD-10-CM

## 2024-07-26 DIAGNOSIS — I48.91 ATRIAL FIBRILLATION, UNSPECIFIED TYPE (MULTI): ICD-10-CM

## 2024-07-26 DIAGNOSIS — R53.1 WEAKNESS: Primary | ICD-10-CM

## 2024-07-26 DIAGNOSIS — E13.9 OTHER SPECIFIED DIABETES MELLITUS WITHOUT COMPLICATION, WITH LONG-TERM CURRENT USE OF INSULIN (MULTI): ICD-10-CM

## 2024-07-26 PROCEDURE — 99308 SBSQ NF CARE LOW MDM 20: CPT | Performed by: REGISTERED NURSE

## 2024-07-26 NOTE — LETTER
Patient: Camila Omer  : 1933    Encounter Date: 2024    PROGRESS NOTE    Subjective  Chief complaint: Camila Omer is a 89 y.o. female who is an acute skilled patient being seen and evaluated for weakness    HPI:  24 Patient has no new complaints or concerns today.  Continues working towards goals in therapy.  Denies constitutional symptoms.    24 Therapy has been working with the patient to improve strength and endurance with ADLs, transfers, and mobility.  Patient continues to work toward goals.  Patient is stable and has no new complaints.  Nursing staff voices no new concerns today.    Objective  Vital signs: 158/88, 97.1, 77, 18, 97%    Physical Exam  Constitutional:       General: She is not in acute distress.  Eyes:      Extraocular Movements: Extraocular movements intact.   Pulmonary:      Effort: Pulmonary effort is normal.   Musculoskeletal:      Cervical back: Neck supple.   Neurological:      Mental Status: She is alert.   Psychiatric:         Mood and Affect: Mood normal.         Behavior: Behavior is cooperative.         Assessment/Plan  Problem List Items Addressed This Visit       Atrial fibrillation (Multi)     Eliquis  Bleeding precautions  Metoprolol  Monitor HR            Diabetes mellitus (Multi)     Monitor fasting blood sugar  Januvia  Insulin         Weakness - Primary     Pt/ot           Medications, treatments, and labs reviewed  Continue medications and treatments as listed in [facility]    Scribe Attestation  I, Amos Hooker   attest that this documentation has been prepared under the direction and in the presence of CHELSEA De La Cruz.    Provider Attestation - Scribe documentation  All medical record entries made by the Scribe were at my direction and personally dictated by me. I have reviewed the chart and agree that the record accurately reflects my personal performance of the history, physical exam, discussion and plan.    Nicholas HERRERA  CHELSEA Bartlett        Electronically Signed By: CHELSEA De La Cruz   7/31/24  9:12 PM

## 2024-07-29 NOTE — PROGRESS NOTES
PROGRESS NOTE    Subjective   Chief complaint: Camila DOE Omer is a 89 y.o. female who is an acute skilled patient being seen and evaluated for weakness    HPI:  7/17/24 Patient in therapy d/t generalized weakness.  Patient presents for f/u.  Continues to work toward goals in therapy.  No new complaints at this time.    Objective   Vital signs: 158/88, 97.1, 77, 18, 97%    Physical Exam  Constitutional:       General: She is not in acute distress.  Eyes:      Extraocular Movements: Extraocular movements intact.   Pulmonary:      Effort: Pulmonary effort is normal.   Musculoskeletal:      Cervical back: Neck supple.   Neurological:      Mental Status: She is alert.   Psychiatric:         Mood and Affect: Mood normal.         Behavior: Behavior is cooperative.         Assessment/Plan   Problem List Items Addressed This Visit       Atrial fibrillation (Multi)     Eliquis  Bleeding precautions  Metoprolol  Monitor HR            Hypertension     Monitor blood pressure  Metoprolol         Foot pain - Primary     Monitor   Check uric acid level           Medications, treatments, and labs reviewed  Continue medications and treatments as listed in [facility]    Scribe Attestation  ITessa Scribe   attest that this documentation has been prepared under the direction and in the presence of CHELSEA De La Cruz.    Provider Attestation - Scribe documentation  All medical record entries made by the Scribe were at my direction and personally dictated by me. I have reviewed the chart and agree that the record accurately reflects my personal performance of the history, physical exam, discussion and plan.    CHELSEA De La Cruz

## 2024-07-29 NOTE — PROGRESS NOTES
PROGRESS NOTE    Subjective   Chief complaint: Camila Omer is a 89 y.o. female who is an acute skilled patient being seen and evaluated for weakness    HPI:  7/24/24 Patient has no new complaints or concerns today.  Continues working towards goals in therapy.  Denies constitutional symptoms.    7/26/24 Therapy has been working with the patient to improve strength and endurance with ADLs, transfers, and mobility.  Patient continues to work toward goals.  Patient is stable and has no new complaints.  Nursing staff voices no new concerns today.    Objective   Vital signs: 158/88, 97.1, 77, 18, 97%    Physical Exam  Constitutional:       General: She is not in acute distress.  Eyes:      Extraocular Movements: Extraocular movements intact.   Pulmonary:      Effort: Pulmonary effort is normal.   Musculoskeletal:      Cervical back: Neck supple.   Neurological:      Mental Status: She is alert.   Psychiatric:         Mood and Affect: Mood normal.         Behavior: Behavior is cooperative.         Assessment/Plan   Problem List Items Addressed This Visit       Atrial fibrillation (Multi)     Eliquis  Bleeding precautions  Metoprolol  Monitor HR            Diabetes mellitus (Multi)     Monitor fasting blood sugar  Januvia  Insulin         Weakness - Primary     Pt/ot           Medications, treatments, and labs reviewed  Continue medications and treatments as listed in [facility]    Scribe Attestation  ITessa Scribe   attest that this documentation has been prepared under the direction and in the presence of CHELSEA De La Cruz.    Provider Attestation - Scribe documentation  All medical record entries made by the Scribe were at my direction and personally dictated by me. I have reviewed the chart and agree that the record accurately reflects my personal performance of the history, physical exam, discussion and plan.    CHELSEA De La Cruz

## 2024-08-02 DIAGNOSIS — G47.00 INSOMNIA, UNSPECIFIED TYPE: ICD-10-CM

## 2024-08-02 DIAGNOSIS — Z79.4 OTHER SPECIFIED DIABETES MELLITUS WITHOUT COMPLICATION, WITH LONG-TERM CURRENT USE OF INSULIN (MULTI): ICD-10-CM

## 2024-08-02 DIAGNOSIS — E13.9 OTHER SPECIFIED DIABETES MELLITUS WITHOUT COMPLICATION, WITH LONG-TERM CURRENT USE OF INSULIN (MULTI): ICD-10-CM

## 2024-08-02 DIAGNOSIS — I10 HYPERTENSION, UNSPECIFIED TYPE: ICD-10-CM

## 2024-08-02 RX ORDER — SPIRONOLACTONE 25 MG/1
25 TABLET ORAL DAILY
Qty: 30 TABLET | Refills: 5 | Status: SHIPPED | OUTPATIENT
Start: 2024-08-02 | End: 2025-01-29

## 2024-08-02 RX ORDER — LOSARTAN POTASSIUM 25 MG/1
25 TABLET ORAL DAILY
Qty: 30 TABLET | Refills: 11 | Status: SHIPPED | OUTPATIENT
Start: 2024-08-02 | End: 2025-08-02

## 2024-08-02 RX ORDER — INSULIN GLARGINE 100 [IU]/ML
16 INJECTION, SOLUTION SUBCUTANEOUS EVERY MORNING
Qty: 10 ML | Refills: 3 | Status: SHIPPED | OUTPATIENT
Start: 2024-08-02

## 2024-08-02 RX ORDER — AMIODARONE HYDROCHLORIDE 200 MG/1
200 TABLET ORAL DAILY
Qty: 30 TABLET | Refills: 11 | Status: SHIPPED | OUTPATIENT
Start: 2024-08-02 | End: 2025-08-02

## 2024-08-02 RX ORDER — METOPROLOL SUCCINATE 50 MG/1
50 TABLET, EXTENDED RELEASE ORAL 2 TIMES DAILY
Qty: 60 TABLET | Refills: 5 | Status: SHIPPED | OUTPATIENT
Start: 2024-08-02 | End: 2025-01-29

## 2024-08-02 RX ORDER — MELATONIN 3 MG
3 CAPSULE ORAL NIGHTLY
Qty: 90 CAPSULE | Refills: 3 | Status: SHIPPED | OUTPATIENT
Start: 2024-08-02

## 2024-08-02 RX ORDER — AMIODARONE HYDROCHLORIDE 400 MG/1
400 TABLET ORAL 2 TIMES DAILY
Qty: 60 TABLET | Refills: 11 | Status: SHIPPED | OUTPATIENT
Start: 2024-08-02 | End: 2025-08-02

## 2024-08-05 ENCOUNTER — APPOINTMENT (OUTPATIENT)
Dept: PRIMARY CARE | Facility: CLINIC | Age: 89
End: 2024-08-05
Payer: MEDICARE

## 2024-08-06 RX ORDER — AMIODARONE HYDROCHLORIDE 400 MG/1
400 TABLET ORAL DAILY
Qty: 7 TABLET | Refills: 0 | Status: SHIPPED | OUTPATIENT
Start: 2024-08-06 | End: 2024-08-13

## 2024-09-04 ENCOUNTER — APPOINTMENT (OUTPATIENT)
Dept: PRIMARY CARE | Facility: CLINIC | Age: 89
End: 2024-09-04
Payer: MEDICARE

## 2024-12-16 ENCOUNTER — NURSING HOME VISIT (OUTPATIENT)
Dept: POST ACUTE CARE | Facility: EXTERNAL LOCATION | Age: 89
End: 2024-12-16
Payer: MEDICARE

## 2024-12-16 DIAGNOSIS — I11.0 HYPERTENSIVE HEART DISEASE WITH CONGESTIVE HEART FAILURE, UNSPECIFIED HEART FAILURE TYPE: ICD-10-CM

## 2024-12-16 DIAGNOSIS — I48.91 ATRIAL FIBRILLATION, UNSPECIFIED TYPE (MULTI): ICD-10-CM

## 2024-12-16 DIAGNOSIS — J18.9 PNEUMONIA DUE TO INFECTIOUS ORGANISM, UNSPECIFIED LATERALITY, UNSPECIFIED PART OF LUNG: Primary | ICD-10-CM

## 2024-12-16 DIAGNOSIS — K21.9 GASTROESOPHAGEAL REFLUX DISEASE, UNSPECIFIED WHETHER ESOPHAGITIS PRESENT: ICD-10-CM

## 2024-12-16 DIAGNOSIS — Z79.4 OTHER SPECIFIED DIABETES MELLITUS WITHOUT COMPLICATION, WITH LONG-TERM CURRENT USE OF INSULIN: ICD-10-CM

## 2024-12-16 DIAGNOSIS — E13.9 OTHER SPECIFIED DIABETES MELLITUS WITHOUT COMPLICATION, WITH LONG-TERM CURRENT USE OF INSULIN: ICD-10-CM

## 2024-12-16 PROCEDURE — 99305 1ST NF CARE MODERATE MDM 35: CPT | Performed by: INTERNAL MEDICINE

## 2024-12-16 NOTE — PROGRESS NOTES
HISTORY & PHYSICAL    Subjective   Chief complaint: Camila Omer is a 89 y.o. female who is being seen and evaluated for multiple medical problems.  Patient presents for admission to nursing facility    HPI:  HPI  Patient is a 89-year-old female presenting for admission to nursing facility following hospitalization.  Patient presented to the hospital due to shortness of breath, previous hospitalization for CHF exacerbation.  Patient denied chest pain.  Patient had a chest x-ray that revealed persistent patchy bilateral interstitial and may be lower infiltrates.  Due to patient's increased dyspnea on exertion.  Patient's family did not feel comfortable taking her home.  Patient was started on antibiotics.  Patient on room air.  Patient transition to prednisone for long-term taper.  PT OT evaluated patient with recommendations for SNF placement at discharge for continued medical management for therapy.  Patient was hemodynamically stable to discharge to SNF for continued medical management and further therapy.  Patient was seen and examined at the bedside, appears to be in no acute distress.  Denies chest pain, shortness of breath, nausea vomiting fever chills  Past Medical History:   Diagnosis Date    Atrial fibrillation (Multi)     Cardiomyopathy     Diabetes mellitus (Multi)     Hypertension     NSTEMI (non-ST elevated myocardial infarction) (Multi)     Presence of coronary angioplasty implant and graft     Shortness of breath     Spondylosis, cervical        No past surgical history on file.    Family History   Problem Relation Name Age of Onset    No Known Problems Mother      No Known Problems Father         Social History     Socioeconomic History    Marital status:      Social Drivers of Health     Food Insecurity: No Food Insecurity (12/9/2024)    Received from [facility]    Hunger Vital Sign     Worried About Running Out of Food in the Last Year: Never true     Ran Out of Food in the Last  Year: Never true   Transportation Needs: No Transportation Needs (12/9/2024)    Received from [facility]    PRAPARE - Transportation     Lack of Transportation (Medical): No     Lack of Transportation (Non-Medical): No   Housing Stability: Unknown (12/9/2024)    Received from [facility]    Housing Stability Vital Sign     Unable to Pay for Housing in the Last Year: No     Homeless in the Last Year: No       Vital signs: 122/53, 66, 17, 97.6, 97% blood sugar 152    Objective   Physical Exam  Constitutional:       General: She is not in acute distress.  Eyes:      Extraocular Movements: Extraocular movements intact.   Cardiovascular:      Rate and Rhythm: Normal rate and regular rhythm.   Pulmonary:      Effort: Pulmonary effort is normal.      Breath sounds: Normal breath sounds.   Abdominal:      General: Bowel sounds are normal.      Palpations: Abdomen is soft.   Musculoskeletal:      Cervical back: Neck supple.      Right lower leg: No edema.      Left lower leg: No edema.   Neurological:      Mental Status: She is alert.   Psychiatric:         Mood and Affect: Mood normal.         Behavior: Behavior is cooperative.         Assessment/Plan   Problem List Items Addressed This Visit       Atrial fibrillation (Multi)     Eliquis  Bleeding precautions  Metoprolol  Monitor heart rate         Hypertensive heart disease with CHF     Monitor blood pressure  Furosemide  Metoprolol  Monitor for shortness of breath, weight gain or edema         Diabetes mellitus (Multi)     Monitor fasting blood sugar  Sitagliptin  Insulin, glargine         GERD (gastroesophageal reflux disease)     PPI  Monitor GI symptoms         Pneumonia - Primary     Completed antibiotics  Continue prednisone, long-term taper  F/u outpatient          Hospital records reviewed  Medications, treatments, and labs reviewed  Continue medications and treatments as listed in EMR  Discussed with nursing and therapy      Avibelaine Attestation  I,  Av De Leónibe   attest that this documentation has been prepared under the direction and in the presence of Taryn Wen MD    Provider Attestation - Scribe documentation  All medical record entries made by the Scribe were at my direction and personally dictated by me. I have reviewed the chart and agree that the record accurately reflects my personal performance of the history, physical exam, discussion and plan.   Taryn Wen MD

## 2024-12-16 NOTE — LETTER
Patient: Camila Omer  : 1933    Encounter Date: 2024    HISTORY & PHYSICAL    Subjective  Chief complaint: Camila Omer is a 89 y.o. female who is being seen and evaluated for multiple medical problems.  Patient presents for admission to nursing facility    HPI:  HPI  Patient is a 89-year-old female presenting for admission to nursing facility following hospitalization.  Patient presented to the hospital due to shortness of breath, previous hospitalization for CHF exacerbation.  Patient denied chest pain.  Patient had a chest x-ray that revealed persistent patchy bilateral interstitial and may be lower infiltrates.  Due to patient's increased dyspnea on exertion.  Patient's family did not feel comfortable taking her home.  Patient was started on antibiotics.  Patient on room air.  Patient transition to prednisone for long-term taper.  PT OT evaluated patient with recommendations for SNF placement at discharge for continued medical management for therapy.  Patient was hemodynamically stable to discharge to SNF for continued medical management and further therapy.  Patient was seen and examined at the bedside, appears to be in no acute distress.  Denies chest pain, shortness of breath, nausea vomiting fever chills  Past Medical History:   Diagnosis Date   • Atrial fibrillation (Multi)    • Cardiomyopathy    • Diabetes mellitus (Multi)    • Hypertension    • NSTEMI (non-ST elevated myocardial infarction) (Multi)    • Presence of coronary angioplasty implant and graft    • Shortness of breath    • Spondylosis, cervical        No past surgical history on file.    Family History   Problem Relation Name Age of Onset   • No Known Problems Mother     • No Known Problems Father         Social History     Socioeconomic History   • Marital status:      Social Drivers of Health     Food Insecurity: No Food Insecurity (2024)    Received from [facility] Vital Sign    • Worried About  Running Out of Food in the Last Year: Never true    • Ran Out of Food in the Last Year: Never true   Transportation Needs: No Transportation Needs (12/9/2024)    Received from [facility] - Transportation    • Lack of Transportation (Medical): No    • Lack of Transportation (Non-Medical): No   Housing Stability: Unknown (12/9/2024)    Received from [facility]    Housing Stability Vital Sign    • Unable to Pay for Housing in the Last Year: No    • Homeless in the Last Year: No       Vital signs: 122/53, 66, 17, 97.6, 97% blood sugar 152    Objective  Physical Exam  Constitutional:       General: She is not in acute distress.  Eyes:      Extraocular Movements: Extraocular movements intact.   Cardiovascular:      Rate and Rhythm: Normal rate and regular rhythm.   Pulmonary:      Effort: Pulmonary effort is normal.      Breath sounds: Normal breath sounds.   Abdominal:      General: Bowel sounds are normal.      Palpations: Abdomen is soft.   Musculoskeletal:      Cervical back: Neck supple.      Right lower leg: No edema.      Left lower leg: No edema.   Neurological:      Mental Status: She is alert.   Psychiatric:         Mood and Affect: Mood normal.         Behavior: Behavior is cooperative.         Assessment/Plan  Problem List Items Addressed This Visit       Atrial fibrillation (Multi)     Eliquis  Bleeding precautions  Metoprolol  Monitor heart rate         Hypertensive heart disease with CHF     Monitor blood pressure  Furosemide  Metoprolol  Monitor for shortness of breath, weight gain or edema         Diabetes mellitus (Multi)     Monitor fasting blood sugar  Sitagliptin  Insulin, glargine         GERD (gastroesophageal reflux disease)     PPI  Monitor GI symptoms         Pneumonia - Primary     Completed antibiotics  Continue prednisone, long-term taper  F/u outpatient          Hospital records reviewed  Medications, treatments, and labs reviewed  Continue medications and treatments as  listed in EMR  Discussed with nursing and therapy      Scribe Attestation  I, Amos De León   attest that this documentation has been prepared under the direction and in the presence of Taryn Wen MD    Provider Attestation - Scribe documentation  All medical record entries made by the Scribe were at my direction and personally dictated by me. I have reviewed the chart and agree that the record accurately reflects my personal performance of the history, physical exam, discussion and plan.   Taryn Wen MD      Electronically Signed By: Taryn Wen MD   12/17/24 10:49 AM

## 2024-12-16 NOTE — ASSESSMENT & PLAN NOTE
Monitor blood pressure  Furosemide  Metoprolol  Monitor for shortness of breath, weight gain or edema

## 2024-12-17 ENCOUNTER — NURSING HOME VISIT (OUTPATIENT)
Dept: POST ACUTE CARE | Facility: EXTERNAL LOCATION | Age: 89
End: 2024-12-17
Payer: MEDICARE

## 2024-12-17 DIAGNOSIS — K21.9 GASTROESOPHAGEAL REFLUX DISEASE, UNSPECIFIED WHETHER ESOPHAGITIS PRESENT: ICD-10-CM

## 2024-12-17 DIAGNOSIS — I48.91 ATRIAL FIBRILLATION, UNSPECIFIED TYPE (MULTI): ICD-10-CM

## 2024-12-17 DIAGNOSIS — E13.9 OTHER SPECIFIED DIABETES MELLITUS WITHOUT COMPLICATION, WITH LONG-TERM CURRENT USE OF INSULIN: ICD-10-CM

## 2024-12-17 DIAGNOSIS — Z79.4 OTHER SPECIFIED DIABETES MELLITUS WITHOUT COMPLICATION, WITH LONG-TERM CURRENT USE OF INSULIN: ICD-10-CM

## 2024-12-17 DIAGNOSIS — R53.1 WEAKNESS: ICD-10-CM

## 2024-12-17 DIAGNOSIS — I11.0 HYPERTENSIVE HEART DISEASE WITH CONGESTIVE HEART FAILURE, UNSPECIFIED HEART FAILURE TYPE: Primary | ICD-10-CM

## 2024-12-17 PROCEDURE — 99309 SBSQ NF CARE MODERATE MDM 30: CPT | Performed by: INTERNAL MEDICINE

## 2024-12-17 NOTE — PROGRESS NOTES
PROGRESS NOTE    Subjective   Chief complaint: Camila Omer is a 89 y.o. female who is an acute skilled patient being seen and evaluated for weakness    HPI:  HPI  An interactive audio and/or video telecommunication system which permits real time communications between the patient (at the originating site) and provider (at a distant site) was utilized to provide this telehealth service after obtaining verbal consent.   Patient presents for f/u therapy and general medical care. Therapy has been working with the patient to improve strength, endurance, ADLs, and transfers d/t generalized weakness.  HTN BP at goal.  Denies chest pain and headache.  CHF stable, denies sob, orthopnea, weight gain.  GERD controlled.  Denies heartburn, regurgitation, epigastric discomfort, sour taste, and cough.  AFIB stable, denies palpitations and chest pain.  DM, denies polydipsia polyuria polyphagia.  Mentation at baseline, no acute distress.    Objective   Vital signs: 120/6053, 17, 97.4, 98% blood sugar 119    Physical Exam  Constitutional:       General: She is not in acute distress.  Eyes:      Extraocular Movements: Extraocular movements intact.   Pulmonary:      Effort: Pulmonary effort is normal.   Musculoskeletal:      Cervical back: Neck supple.   Neurological:      Mental Status: She is alert.   Psychiatric:         Mood and Affect: Mood normal.         Behavior: Behavior is cooperative.         Assessment/Plan   Problem List Items Addressed This Visit       Atrial fibrillation (Multi)     Eliquis  Bleeding precautions  Metoprolol  Monitor heart rate         Hypertensive heart disease with CHF - Primary     Monitor blood pressure  Furosemide  Metoprolol  Monitor for shortness of breath, weight gain or edema         Diabetes mellitus (Multi)     Monitor fasting blood sugar  Sitagliptin  Insulin, glargine         Weakness     Continue therapy         GERD (gastroesophageal reflux disease)     PPI  Monitor GI symptoms           Medications, treatments, and labs reviewed  Continue medications and treatments as listed in EMR      Scribe Attestation  I, Amos De León   attest that this documentation has been prepared under the direction and in the presence of Taryn Wen MD    Provider Attestation - Scribe documentation  All medical record entries made by the Scribe were at my direction and personally dictated by me. I have reviewed the chart and agree that the record accurately reflects my personal performance of the history, physical exam, discussion and plan.   Taryn Wen MD

## 2024-12-17 NOTE — LETTER
Patient: Camila Omer  : 1933    Encounter Date: 2024    PROGRESS NOTE    Subjective  Chief complaint: Camila Omer is a 89 y.o. female who is an acute skilled patient being seen and evaluated for weakness    HPI:  HPI  An interactive audio and/or video telecommunication system which permits real time communications between the patient (at the originating site) and provider (at a distant site) was utilized to provide this telehealth service after obtaining verbal consent.   Patient presents for f/u therapy and general medical care. Therapy has been working with the patient to improve strength, endurance, ADLs, and transfers d/t generalized weakness.  HTN BP at goal.  Denies chest pain and headache.  CHF stable, denies sob, orthopnea, weight gain.  GERD controlled.  Denies heartburn, regurgitation, epigastric discomfort, sour taste, and cough.  AFIB stable, denies palpitations and chest pain.  DM, denies polydipsia polyuria polyphagia.  Mentation at baseline, no acute distress.    Objective  Vital signs: 120/6053, 17, 97.4, 98% blood sugar 119    Physical Exam  Constitutional:       General: She is not in acute distress.  Eyes:      Extraocular Movements: Extraocular movements intact.   Pulmonary:      Effort: Pulmonary effort is normal.   Musculoskeletal:      Cervical back: Neck supple.   Neurological:      Mental Status: She is alert.   Psychiatric:         Mood and Affect: Mood normal.         Behavior: Behavior is cooperative.         Assessment/Plan  Problem List Items Addressed This Visit       Atrial fibrillation (Multi)     Eliquis  Bleeding precautions  Metoprolol  Monitor heart rate         Hypertensive heart disease with CHF - Primary     Monitor blood pressure  Furosemide  Metoprolol  Monitor for shortness of breath, weight gain or edema         Diabetes mellitus (Multi)     Monitor fasting blood sugar  Sitagliptin  Insulin, glargine         Weakness     Continue therapy         GERD  (gastroesophageal reflux disease)     PPI  Monitor GI symptoms          Medications, treatments, and labs reviewed  Continue medications and treatments as listed in EMR      Scribe Attestation  I, Amos De León   attest that this documentation has been prepared under the direction and in the presence of Taryn Wen MD    Provider Attestation - Scribe documentation  All medical record entries made by the Scribe were at my direction and personally dictated by me. I have reviewed the chart and agree that the record accurately reflects my personal performance of the history, physical exam, discussion and plan.   Taryn Wen MD        Electronically Signed By: Taryn Wen MD   12/18/24 10:25 AM

## 2024-12-19 ENCOUNTER — NURSING HOME VISIT (OUTPATIENT)
Dept: POST ACUTE CARE | Facility: EXTERNAL LOCATION | Age: 89
End: 2024-12-19
Payer: MEDICARE

## 2024-12-19 DIAGNOSIS — I21.4 NSTEMI (NON-ST ELEVATED MYOCARDIAL INFARCTION) (MULTI): ICD-10-CM

## 2024-12-19 DIAGNOSIS — E13.9 OTHER SPECIFIED DIABETES MELLITUS WITHOUT COMPLICATION, WITH LONG-TERM CURRENT USE OF INSULIN: ICD-10-CM

## 2024-12-19 DIAGNOSIS — I11.0 HYPERTENSIVE HEART DISEASE WITH CONGESTIVE HEART FAILURE, UNSPECIFIED HEART FAILURE TYPE: Primary | ICD-10-CM

## 2024-12-19 DIAGNOSIS — I48.91 ATRIAL FIBRILLATION, UNSPECIFIED TYPE (MULTI): ICD-10-CM

## 2024-12-19 DIAGNOSIS — R53.1 WEAKNESS: ICD-10-CM

## 2024-12-19 DIAGNOSIS — Z79.4 OTHER SPECIFIED DIABETES MELLITUS WITHOUT COMPLICATION, WITH LONG-TERM CURRENT USE OF INSULIN: ICD-10-CM

## 2024-12-19 PROCEDURE — 99308 SBSQ NF CARE LOW MDM 20: CPT | Performed by: INTERNAL MEDICINE

## 2024-12-19 NOTE — LETTER
Patient: Camila Omer  : 1933    Encounter Date: 2024    PROGRESS NOTE    Subjective  Chief complaint: Camila Omer is a 89 y.o. female who is an acute skilled patient being seen and evaluated for weakness    HPI:  HPI  An interactive audio and/or video telecommunication system which permits real time communications between the patient (at the originating site) and provider (at a distant site) was utilized to provide this telehealth service after obtaining verbal consent.   Patient presents for f/u therapy and general medical care.  Patient seen and examined at bedside.  Therapy has been working with the patient to improve strength, endurance, ADLs, and transfers d/t generalized weakness.  Patient has no acute concerns today. Patient denies constitutional symptoms.  Nursing reporting no new concerns.    Objective  Vital signs: 118/64, 57, 97% blood sugar 123    Physical Exam  Constitutional:       General: She is not in acute distress.  Eyes:      Extraocular Movements: Extraocular movements intact.   Pulmonary:      Effort: Pulmonary effort is normal.   Musculoskeletal:      Cervical back: Neck supple.   Neurological:      Mental Status: She is alert.   Psychiatric:         Mood and Affect: Mood normal.         Behavior: Behavior is cooperative.         Assessment/Plan  Problem List Items Addressed This Visit       Atrial fibrillation (Multi)     Eliquis  Bleeding precautions  Metoprolol  Monitor heart rate         NSTEMI (non-ST elevated myocardial infarction) (Multi)     Clopidogrel  Bleeding precautions  F/u outpatient cardiology         Hypertensive heart disease with CHF - Primary     Monitor blood pressure  Furosemide  Metoprolol  Monitor for shortness of breath, weight gain or edema         Diabetes mellitus (Multi)     Monitor fasting blood sugar  Sitagliptin  Insulin, glargine         Weakness     Work with therapy towards goals          Medications, treatments, and labs  reviewed  Continue medications and treatments as listed in EMR      Scribe Attestation  I, Amos De León   attest that this documentation has been prepared under the direction and in the presence of Taryn Wen MD    Provider Attestation - Scribe documentation  All medical record entries made by the Scribe were at my direction and personally dictated by me. I have reviewed the chart and agree that the record accurately reflects my personal performance of the history, physical exam, discussion and plan.   Taryn Wen MD        Electronically Signed By: Taryn Wen MD   12/20/24  3:05 PM

## 2024-12-19 NOTE — PROGRESS NOTES
----- Message from Genevieve Garnica sent at 8/21/2020 11:48 AM CDT -----  Contact: self/[phone]  Type:  RX Refill Request    Who Called: patient  Refill or New Rx:refill  RX Name and Strength:dextroamphetamine-amphetamine 30 mg Tab  How is the patient currently taking it? (ex. 1XDay):2 X Day  Is this a 30 day or 90 day RX:30  Preferred Pharmacy with phone number:  [id number] - [facility] [address] AT [facility] [address] [place] [address] [address]  Phone: [phone] Fax: [phone]      Local or Mail Order:local  Ordering Provider:Dr Giraldo  Would the patient rather a call back or a response via [facility]? Call Back  Best Call Back Number:[phone]  Additional Information:     Dillon/SYLVIA       PROGRESS NOTE    Subjective   Chief complaint: Camila Omer is a 89 y.o. female who is an acute skilled patient being seen and evaluated for weakness    HPI:  HPI  An interactive audio and/or video telecommunication system which permits real time communications between the patient (at the originating site) and provider (at a distant site) was utilized to provide this telehealth service after obtaining verbal consent.   Patient presents for f/u therapy and general medical care.  Patient seen and examined at bedside.  Therapy has been working with the patient to improve strength, endurance, ADLs, and transfers d/t generalized weakness.  Patient has no acute concerns today. Patient denies constitutional symptoms.  Nursing reporting no new concerns.    Objective   Vital signs: 118/64, 57, 97% blood sugar 123    Physical Exam  Constitutional:       General: She is not in acute distress.  Eyes:      Extraocular Movements: Extraocular movements intact.   Pulmonary:      Effort: Pulmonary effort is normal.   Musculoskeletal:      Cervical back: Neck supple.   Neurological:      Mental Status: She is alert.   Psychiatric:         Mood and Affect: Mood normal.         Behavior: Behavior is cooperative.         Assessment/Plan   Problem List Items Addressed This Visit       Atrial fibrillation (Multi)     Eliquis  Bleeding precautions  Metoprolol  Monitor heart rate         NSTEMI (non-ST elevated myocardial infarction) (Multi)     Clopidogrel  Bleeding precautions  F/u outpatient cardiology         Hypertensive heart disease with CHF - Primary     Monitor blood pressure  Furosemide  Metoprolol  Monitor for shortness of breath, weight gain or edema         Diabetes mellitus (Multi)     Monitor fasting blood sugar  Sitagliptin  Insulin, glargine         Weakness     Work with therapy towards goals          Medications, treatments, and labs reviewed  Continue medications and treatments as listed in EMR      Scribe  Attestation  I, Amos De León   attest that this documentation has been prepared under the direction and in the presence of Taryn Wen MD    Provider Attestation - Scribe documentation  All medical record entries made by the Scribe were at my direction and personally dictated by me. I have reviewed the chart and agree that the record accurately reflects my personal performance of the history, physical exam, discussion and plan.   Taryn Wen MD

## 2024-12-23 ENCOUNTER — NURSING HOME VISIT (OUTPATIENT)
Dept: POST ACUTE CARE | Facility: EXTERNAL LOCATION | Age: 89
End: 2024-12-23
Payer: MEDICARE

## 2024-12-23 DIAGNOSIS — E13.9 OTHER SPECIFIED DIABETES MELLITUS WITHOUT COMPLICATION, WITH LONG-TERM CURRENT USE OF INSULIN: ICD-10-CM

## 2024-12-23 DIAGNOSIS — M54.9 BACK PAIN, UNSPECIFIED BACK LOCATION, UNSPECIFIED BACK PAIN LATERALITY, UNSPECIFIED CHRONICITY: ICD-10-CM

## 2024-12-23 DIAGNOSIS — I21.4 NSTEMI (NON-ST ELEVATED MYOCARDIAL INFARCTION) (MULTI): ICD-10-CM

## 2024-12-23 DIAGNOSIS — Z79.4 OTHER SPECIFIED DIABETES MELLITUS WITHOUT COMPLICATION, WITH LONG-TERM CURRENT USE OF INSULIN: ICD-10-CM

## 2024-12-23 DIAGNOSIS — I11.0 HYPERTENSIVE HEART DISEASE WITH CONGESTIVE HEART FAILURE, UNSPECIFIED HEART FAILURE TYPE: Primary | ICD-10-CM

## 2024-12-23 DIAGNOSIS — I48.91 ATRIAL FIBRILLATION, UNSPECIFIED TYPE (MULTI): ICD-10-CM

## 2024-12-23 DIAGNOSIS — R53.1 WEAKNESS: ICD-10-CM

## 2024-12-23 PROCEDURE — 99308 SBSQ NF CARE LOW MDM 20: CPT | Performed by: INTERNAL MEDICINE

## 2024-12-23 NOTE — PROGRESS NOTES
PROGRESS NOTE    Subjective   Chief complaint: Camila Omer is a 89 y.o. female who is an acute skilled patient being seen and evaluated for weakness    HPI:  HPI  Patient does continue to work with therapy due to generalized weakness.  Therapy is working patient on gait training with use of a walker and patient is ambulating increasing distances with SBA.  Therapy also working patient on completing transfers from various surfaces and patient is able to complete with SBA. Patient seen and examined at the bedside, appears to be in no acute distress.  Patient is stable and has no new complaints.  Nursing staff voices no new concerns today.    Objective   Vital signs: 133/65, 52, 18, 97.6, 96% blood sugar 177    Physical Exam  Constitutional:       General: She is not in acute distress.  Eyes:      Extraocular Movements: Extraocular movements intact.   Cardiovascular:      Rate and Rhythm: Normal rate and regular rhythm.   Pulmonary:      Effort: Pulmonary effort is normal.      Breath sounds: Normal breath sounds.   Abdominal:      General: Bowel sounds are normal.      Palpations: Abdomen is soft.   Musculoskeletal:      Cervical back: Neck supple.      Right lower leg: No edema.      Left lower leg: No edema.   Neurological:      Mental Status: She is alert.      Motor: Weakness present.   Psychiatric:         Mood and Affect: Mood normal.         Behavior: Behavior is cooperative.         Assessment/Plan   Problem List Items Addressed This Visit       Atrial fibrillation (Multi)     Eliquis  Bleeding precautions  Metoprolol  Monitor heart rate         NSTEMI (non-ST elevated myocardial infarction) (Multi)     Clopidogrel  Bleeding precautions  F/u outpatient cardiology         Hypertensive heart disease with CHF - Primary     Monitor blood pressure  Furosemide  Metoprolol  Monitor for shortness of breath, weight gain or edema         Diabetes mellitus (Multi)     Monitor fasting blood  sugar  Sitagliptin  Insulin, glargine         Weakness     Continue progressing in therapy         Back pain     Medications, treatments, and labs reviewed  Continue medications and treatments as listed in EMR      Scribe Attestation  I, Amos De León   attest that this documentation has been prepared under the direction and in the presence of Taryn Wen MD    Provider Attestation - Scribe documentation  All medical record entries made by the Scribe were at my direction and personally dictated by me. I have reviewed the chart and agree that the record accurately reflects my personal performance of the history, physical exam, discussion and plan.   Taryn Wen MD

## 2024-12-23 NOTE — LETTER
Patient: Camila Omer  : 1933    Encounter Date: 2024    PROGRESS NOTE    Subjective  Chief complaint: Camila Omer is a 89 y.o. female who is an acute skilled patient being seen and evaluated for weakness    HPI:  HPI  Patient does continue to work with therapy due to generalized weakness.  Therapy is working patient on gait training with use of a walker and patient is ambulating increasing distances with SBA.  Therapy also working patient on completing transfers from various surfaces and patient is able to complete with SBA. Patient seen and examined at the bedside, appears to be in no acute distress.  Patient is stable and has no new complaints.  Nursing staff voices no new concerns today.    Objective  Vital signs: 133/65, 52, 18, 97.6, 96% blood sugar 177    Physical Exam  Constitutional:       General: She is not in acute distress.  Eyes:      Extraocular Movements: Extraocular movements intact.   Cardiovascular:      Rate and Rhythm: Normal rate and regular rhythm.   Pulmonary:      Effort: Pulmonary effort is normal.      Breath sounds: Normal breath sounds.   Abdominal:      General: Bowel sounds are normal.      Palpations: Abdomen is soft.   Musculoskeletal:      Cervical back: Neck supple.      Right lower leg: No edema.      Left lower leg: No edema.   Neurological:      Mental Status: She is alert.      Motor: Weakness present.   Psychiatric:         Mood and Affect: Mood normal.         Behavior: Behavior is cooperative.         Assessment/Plan  Problem List Items Addressed This Visit       Atrial fibrillation (Multi)     Eliquis  Bleeding precautions  Metoprolol  Monitor heart rate         NSTEMI (non-ST elevated myocardial infarction) (Multi)     Clopidogrel  Bleeding precautions  F/u outpatient cardiology         Hypertensive heart disease with CHF - Primary     Monitor blood pressure  Furosemide  Metoprolol  Monitor for shortness of breath, weight gain or edema         Diabetes  mellitus (Multi)     Monitor fasting blood sugar  Sitagliptin  Insulin, glargine         Weakness     Continue progressing in therapy         Back pain     Medications, treatments, and labs reviewed  Continue medications and treatments as listed in EMR      Scribe Attestation  I, Amos De León   attest that this documentation has been prepared under the direction and in the presence of Taryn Wen MD    Provider Attestation - Scribe documentation  All medical record entries made by the Scribe were at my direction and personally dictated by me. I have reviewed the chart and agree that the record accurately reflects my personal performance of the history, physical exam, discussion and plan.   Taryn Wen MD        Electronically Signed By: Taryn Wen MD   12/24/24  2:06 PM

## 2024-12-24 ENCOUNTER — NURSING HOME VISIT (OUTPATIENT)
Dept: POST ACUTE CARE | Facility: EXTERNAL LOCATION | Age: 89
End: 2024-12-24
Payer: MEDICARE

## 2024-12-24 DIAGNOSIS — I11.0 HYPERTENSIVE HEART DISEASE WITH CONGESTIVE HEART FAILURE, UNSPECIFIED HEART FAILURE TYPE: Primary | ICD-10-CM

## 2024-12-24 DIAGNOSIS — I48.91 ATRIAL FIBRILLATION, UNSPECIFIED TYPE (MULTI): ICD-10-CM

## 2024-12-24 DIAGNOSIS — Z79.4 OTHER SPECIFIED DIABETES MELLITUS WITHOUT COMPLICATION, WITH LONG-TERM CURRENT USE OF INSULIN: ICD-10-CM

## 2024-12-24 DIAGNOSIS — E13.9 OTHER SPECIFIED DIABETES MELLITUS WITHOUT COMPLICATION, WITH LONG-TERM CURRENT USE OF INSULIN: ICD-10-CM

## 2024-12-24 DIAGNOSIS — R53.1 WEAKNESS: ICD-10-CM

## 2024-12-24 PROCEDURE — 99308 SBSQ NF CARE LOW MDM 20: CPT | Performed by: INTERNAL MEDICINE

## 2024-12-24 NOTE — PROGRESS NOTES
PROGRESS NOTE    Subjective   Chief complaint: Camila Omer is a 89 y.o. female who is an acute skilled patient being seen and evaluated for weakness    HPI:  HPI  Therapy does continue to work with patient.  Therapy is focusing on performing therapeutic exercise and activities to improve strength, endurance and range of motion for increased independence with functional mobility and tasks.  Patient is working towards goals.  Patient was seen and examined at the bedside, appears to be in no acute distress.  Denies chest pain, shortness of breath, nausea vomiting fever chills.  Nursing voicing no new concerns.    Objective   Vital signs: 117/52, 18, 97.6, 96% blood sugar 177    Physical Exam  Constitutional:       General: She is not in acute distress.  Eyes:      Extraocular Movements: Extraocular movements intact.   Cardiovascular:      Rate and Rhythm: Normal rate and regular rhythm.   Pulmonary:      Effort: Pulmonary effort is normal.      Breath sounds: Normal breath sounds.   Abdominal:      General: Bowel sounds are normal.      Palpations: Abdomen is soft.   Musculoskeletal:      Cervical back: Neck supple.      Right lower leg: No edema.      Left lower leg: No edema.   Neurological:      Mental Status: She is alert.      Motor: Weakness present.   Psychiatric:         Mood and Affect: Mood normal.         Behavior: Behavior is cooperative.         Assessment/Plan   Problem List Items Addressed This Visit       Atrial fibrillation (Multi)     Eliquis  Bleeding precautions  Metoprolol  Monitor heart rate         Hypertensive heart disease with CHF - Primary     Monitor blood pressure  Furosemide  Metoprolol  Monitor for shortness of breath, weight gain or edema         Diabetes mellitus (Multi)     Monitor fasting blood sugar  Sitagliptin  Insulin, glargine         Weakness     Work with therapy towards goals established          Medications, treatments, and labs reviewed  Continue medications and  treatments as listed in EMR      Scribe Attestation  I, Amos De León   attest that this documentation has been prepared under the direction and in the presence of Taryn Wen MD    Provider Attestation - Scribe documentation  All medical record entries made by the Scribe were at my direction and personally dictated by me. I have reviewed the chart and agree that the record accurately reflects my personal performance of the history, physical exam, discussion and plan.   Taryn Wen MD

## 2024-12-24 NOTE — LETTER
Patient: Camila Omer  : 1933    Encounter Date: 2024    PROGRESS NOTE    Subjective  Chief complaint: Camila Omer is a 89 y.o. female who is an acute skilled patient being seen and evaluated for weakness    HPI:  HPI  Therapy does continue to work with patient.  Therapy is focusing on performing therapeutic exercise and activities to improve strength, endurance and range of motion for increased independence with functional mobility and tasks.  Patient is working towards goals.  Patient was seen and examined at the bedside, appears to be in no acute distress.  Denies chest pain, shortness of breath, nausea vomiting fever chills.  Nursing voicing no new concerns.    Objective  Vital signs: 117/52, 18, 97.6, 96% blood sugar 177    Physical Exam  Constitutional:       General: She is not in acute distress.  Eyes:      Extraocular Movements: Extraocular movements intact.   Cardiovascular:      Rate and Rhythm: Normal rate and regular rhythm.   Pulmonary:      Effort: Pulmonary effort is normal.      Breath sounds: Normal breath sounds.   Abdominal:      General: Bowel sounds are normal.      Palpations: Abdomen is soft.   Musculoskeletal:      Cervical back: Neck supple.      Right lower leg: No edema.      Left lower leg: No edema.   Neurological:      Mental Status: She is alert.      Motor: Weakness present.   Psychiatric:         Mood and Affect: Mood normal.         Behavior: Behavior is cooperative.         Assessment/Plan  Problem List Items Addressed This Visit       Atrial fibrillation (Multi)     Eliquis  Bleeding precautions  Metoprolol  Monitor heart rate         Hypertensive heart disease with CHF - Primary     Monitor blood pressure  Furosemide  Metoprolol  Monitor for shortness of breath, weight gain or edema         Diabetes mellitus (Multi)     Monitor fasting blood sugar  Sitagliptin  Insulin, glargine         Weakness     Work with therapy towards goals established           Medications, treatments, and labs reviewed  Continue medications and treatments as listed in EMR      Scribe Attestation  I, Amos De León   attest that this documentation has been prepared under the direction and in the presence of Taryn Wen MD    Provider Attestation - Scribe documentation  All medical record entries made by the Scribe were at my direction and personally dictated by me. I have reviewed the chart and agree that the record accurately reflects my personal performance of the history, physical exam, discussion and plan.   Taryn Wen MD        Electronically Signed By: Taryn Wen MD   12/25/24 12:24 PM

## 2024-12-26 ENCOUNTER — NURSING HOME VISIT (OUTPATIENT)
Dept: POST ACUTE CARE | Facility: EXTERNAL LOCATION | Age: 89
End: 2024-12-26
Payer: MEDICARE

## 2024-12-26 DIAGNOSIS — I11.0 HYPERTENSIVE HEART DISEASE WITH CONGESTIVE HEART FAILURE, UNSPECIFIED HEART FAILURE TYPE: Primary | ICD-10-CM

## 2024-12-26 DIAGNOSIS — I48.91 ATRIAL FIBRILLATION, UNSPECIFIED TYPE (MULTI): ICD-10-CM

## 2024-12-26 DIAGNOSIS — Z79.4 OTHER SPECIFIED DIABETES MELLITUS WITHOUT COMPLICATION, WITH LONG-TERM CURRENT USE OF INSULIN: ICD-10-CM

## 2024-12-26 DIAGNOSIS — E13.9 OTHER SPECIFIED DIABETES MELLITUS WITHOUT COMPLICATION, WITH LONG-TERM CURRENT USE OF INSULIN: ICD-10-CM

## 2024-12-26 DIAGNOSIS — K21.9 GASTROESOPHAGEAL REFLUX DISEASE, UNSPECIFIED WHETHER ESOPHAGITIS PRESENT: ICD-10-CM

## 2024-12-26 DIAGNOSIS — R53.1 WEAKNESS: ICD-10-CM

## 2024-12-26 PROCEDURE — 99308 SBSQ NF CARE LOW MDM 20: CPT | Performed by: INTERNAL MEDICINE

## 2024-12-26 NOTE — PROGRESS NOTES
PROGRESS NOTE    Subjective   Chief complaint: Camila Omer is a 89 y.o. female who is an acute skilled patient being seen and evaluated for weakness    HPI:  HPI  Patient has been residing in skilled nursing facility following hospitalization.  Patient has been working in therapy and made improvements.  Patient has completed therapy at this time and is discharging home today.  Patient seen and examined at the bedside, appears to be in no acute distress.  Nursing reporting no new concerns.  Patient has no further questions or concerns    Objective   Vital signs: 110/66, 97.3, 17, 96% blood sugar 106    Physical Exam  Constitutional:       General: She is not in acute distress.  Eyes:      Extraocular Movements: Extraocular movements intact.   Cardiovascular:      Rate and Rhythm: Normal rate and regular rhythm.   Pulmonary:      Effort: Pulmonary effort is normal.      Breath sounds: Normal breath sounds.   Abdominal:      General: Bowel sounds are normal.      Palpations: Abdomen is soft.   Musculoskeletal:      Cervical back: Neck supple.      Right lower leg: No edema.      Left lower leg: No edema.   Neurological:      Mental Status: She is alert.      Motor: Weakness present.   Psychiatric:         Mood and Affect: Mood normal.         Behavior: Behavior is cooperative.       ADMITTING/DISCHARGE DIAGNOSES:  Assessment/Plan   Problem List Items Addressed This Visit       Atrial fibrillation (Multi)     Eliquis  Bleeding precautions  Metoprolol  Monitor heart rate         Hypertensive heart disease with CHF - Primary     Monitor blood pressure  Furosemide  Metoprolol  Monitor for shortness of breath, weight gain or edema         Diabetes mellitus (Multi)     Monitor fasting blood sugar  Sitagliptin  Insulin, glargine         Weakness     Completed therapy         GERD (gastroesophageal reflux disease)     PPI  Monitor GI symptoms        Prognosis - fair  Course - therapy  Plan - [place] home with [facility]   Follow up  with PCP within 2 weeks  Medications called into pharmacy by office  Condition at discharge is stable  Medications, treatments, and labs reviewed  Continue medications and treatments as listed in EMR      Scribe Attestation  I, Amos De León   attest that this documentation has been prepared under the direction and in the presence of Taryn Wen MD    Provider Attestation - Scribe documentation  All medical record entries made by the Scribe were at my direction and personally dictated by me. I have reviewed the chart and agree that the record accurately reflects my personal performance of the history, physical exam, discussion and plan.   Taryn Wen MD

## 2024-12-26 NOTE — LETTER
Patient: Camila Omer  : 1933    Encounter Date: 2024    PROGRESS NOTE    Subjective  Chief complaint: Camila Omer is a 89 y.o. female who is an acute skilled patient being seen and evaluated for weakness    HPI:  HPI  Patient has been residing in skilled nursing facility following hospitalization.  Patient has been working in therapy and made improvements.  Patient has completed therapy at this time and is discharging home today.  Patient seen and examined at the bedside, appears to be in no acute distress.  Nursing reporting no new concerns.  Patient has no further questions or concerns    Objective  Vital signs: 110/66, 97.3, 17, 96% blood sugar 106    Physical Exam  Constitutional:       General: She is not in acute distress.  Eyes:      Extraocular Movements: Extraocular movements intact.   Cardiovascular:      Rate and Rhythm: Normal rate and regular rhythm.   Pulmonary:      Effort: Pulmonary effort is normal.      Breath sounds: Normal breath sounds.   Abdominal:      General: Bowel sounds are normal.      Palpations: Abdomen is soft.   Musculoskeletal:      Cervical back: Neck supple.      Right lower leg: No edema.      Left lower leg: No edema.   Neurological:      Mental Status: She is alert.      Motor: Weakness present.   Psychiatric:         Mood and Affect: Mood normal.         Behavior: Behavior is cooperative.       ADMITTING/DISCHARGE DIAGNOSES:  Assessment/Plan  Problem List Items Addressed This Visit       Atrial fibrillation (Multi)     Eliquis  Bleeding precautions  Metoprolol  Monitor heart rate         Hypertensive heart disease with CHF - Primary     Monitor blood pressure  Furosemide  Metoprolol  Monitor for shortness of breath, weight gain or edema         Diabetes mellitus (Multi)     Monitor fasting blood sugar  Sitagliptin  Insulin, glargine         Weakness     Completed therapy         GERD (gastroesophageal reflux disease)     PPI  Monitor GI symptoms         Prognosis - fair  Course - therapy  Plan - [place] home with [facility]   Follow up with PCP within 2 weeks  Medications called into pharmacy by office  Condition at discharge is stable  Medications, treatments, and labs reviewed  Continue medications and treatments as listed in EMR      Scribe Attestation  Jessica VAUGHN Scribe   attest that this documentation has been prepared under the direction and in the presence of Taryn Wen MD    Provider Attestation - Scribe documentation  All medical record entries made by the Scribe were at my direction and personally dictated by me. I have reviewed the chart and agree that the record accurately reflects my personal performance of the history, physical exam, discussion and plan.   Taryn Wen MD        Electronically Signed By: Taryn Wen MD   12/27/24  4:10 PM